# Patient Record
Sex: FEMALE | Race: WHITE | Employment: OTHER | URBAN - METROPOLITAN AREA
[De-identification: names, ages, dates, MRNs, and addresses within clinical notes are randomized per-mention and may not be internally consistent; named-entity substitution may affect disease eponyms.]

---

## 2024-06-27 ENCOUNTER — PROCEDURE VISIT (OUTPATIENT)
Age: 69
End: 2024-06-27
Payer: MEDICARE

## 2024-06-27 DIAGNOSIS — M79.641 RIGHT HAND PAIN: ICD-10-CM

## 2024-06-27 DIAGNOSIS — M79.2 NEURALGIA: ICD-10-CM

## 2024-06-27 DIAGNOSIS — R20.2 NUMBNESS AND TINGLING IN RIGHT HAND: Primary | ICD-10-CM

## 2024-06-27 DIAGNOSIS — R20.0 NUMBNESS AND TINGLING IN RIGHT HAND: Primary | ICD-10-CM

## 2024-06-27 PROCEDURE — 95886 MUSC TEST DONE W/N TEST COMP: CPT | Performed by: PSYCHIATRY & NEUROLOGY

## 2024-06-27 PROCEDURE — 95910 NRV CNDJ TEST 7-8 STUDIES: CPT | Performed by: PSYCHIATRY & NEUROLOGY

## 2024-06-27 NOTE — PROGRESS NOTES
ELECTRODIANOSTIC REPORT  Test Date:  2024    Patient: Ariella Tong : 1955 Physician: Dr. Sanders   Sex: Female Tech: Adamaris Guajardo, EMG Technician  Ref Phys: Dr. Rogelio Duke     CHIEF COMPLAINTS:  Patient is a 69 year-old female who presents with sensory disturbance and pain in her right upper extremity.  The numbness is predominantly surrounding her right thumb however other fingers are also involved.  Strength is 5 out of 5    EMG & NCV Findings:    Nerve conduction studies as listed below including the right median sensory, ulnar sensory, radial sensory, median and ulnar mixed nerve studies and median and ulnar motor studies were within reference of normal.    Disposable concentric needle examination of the muscles listed below in the right upper extremity was normal.      Interpretation:    This study was normal.  There was no electrodiagnostic evidence upon today’s examination suggesting a focal or generalized large fiber neuropathy, myopathy, or cervical radiculopathy.     __________________  Magdi Sanders D.O. FAAN        Nerve Conduction Studies  Anti Sensory Summary Table     Stim Site NR Peak (ms) Norm Peak (ms) O-P Amp (µV) Norm O-P Amp Site1 Site2 Dist (cm)   Right Median Anti Sensory (2nd Digit)  34.2 °C   Wrist    3.0 <4 14.5 >11 Wrist 2nd Digit 14.0   Right Radial Anti Sensory (Base 1st Digit)  34.2 °C   Wrist    2.1 <2.9 22.1 >15 Wrist Base 1st Digit 10.0   Right Ulnar Anti Sensory (5th Digit)  34.2 °C   Wrist    2.5 <4.0 10.4 >10 Wrist 5th Digit 14.0     Motor Summary Table     Stim Site NR Onset (ms) Norm Onset (ms) O-P* Amp (mV) Norm O-P Amp P-T Amp (mV) Site1 Site2 Dist (cm) Rj (m/s)   Right Median Motor (Abd Poll Brev)  34.2 °C   Wrist    3.2 <4.5 6.5 >4.1  Wrist Abd Poll Brev 8.0    Elbow    7.1  5.7   Elbow Wrist 22.0 56   Right Ulnar Motor (Abd Dig Minimi)  34.2 °C   Wrist    2.6 <3.1 8.3 >7.0  Wrist Abd Dig Minimi 8.0    B Elbow    5.6  7.4   B Elbow Wrist 18.0 60   A